# Patient Record
Sex: MALE | Race: BLACK OR AFRICAN AMERICAN
[De-identification: names, ages, dates, MRNs, and addresses within clinical notes are randomized per-mention and may not be internally consistent; named-entity substitution may affect disease eponyms.]

---

## 2019-06-24 ENCOUNTER — HOSPITAL ENCOUNTER (EMERGENCY)
Dept: HOSPITAL 47 - EC | Age: 29
Discharge: HOME | End: 2019-06-24
Payer: COMMERCIAL

## 2019-06-24 VITALS
TEMPERATURE: 98.7 F | HEART RATE: 81 BPM | DIASTOLIC BLOOD PRESSURE: 87 MMHG | RESPIRATION RATE: 18 BRPM | SYSTOLIC BLOOD PRESSURE: 134 MMHG

## 2019-06-24 DIAGNOSIS — K52.9: ICD-10-CM

## 2019-06-24 DIAGNOSIS — B36.0: Primary | ICD-10-CM

## 2019-06-24 DIAGNOSIS — F17.200: ICD-10-CM

## 2019-06-24 LAB
ALBUMIN SERPL-MCNC: 4.5 G/DL (ref 3.5–5)
ALP SERPL-CCNC: 76 U/L (ref 38–126)
ALT SERPL-CCNC: 21 U/L (ref 21–72)
ANION GAP SERPL CALC-SCNC: 10 MMOL/L
AST SERPL-CCNC: 36 U/L (ref 17–59)
BUN SERPL-SCNC: 14 MG/DL (ref 9–20)
CALCIUM SPEC-MCNC: 9.5 MG/DL (ref 8.4–10.2)
CELLS COUNTED: 100
CHLORIDE SERPL-SCNC: 99 MMOL/L (ref 98–107)
CO2 SERPL-SCNC: 32 MMOL/L (ref 22–30)
ERYTHROCYTE [DISTWIDTH] IN BLOOD BY AUTOMATED COUNT: 5.01 M/UL (ref 4.3–5.9)
ERYTHROCYTE [DISTWIDTH] IN BLOOD: 13.5 % (ref 11.5–15.5)
GLUCOSE SERPL-MCNC: 83 MG/DL (ref 74–99)
HCT VFR BLD AUTO: 41.6 % (ref 39–53)
HGB BLD-MCNC: 13.8 GM/DL (ref 13–17.5)
LIPASE SERPL-CCNC: 39 U/L (ref 23–300)
LYMPHOCYTES # BLD MANUAL: 1.64 K/UL (ref 1–4.8)
MCH RBC QN AUTO: 27.5 PG (ref 25–35)
MCHC RBC AUTO-ENTMCNC: 33.1 G/DL (ref 31–37)
MCV RBC AUTO: 83 FL (ref 80–100)
MONOCYTES # BLD MANUAL: 0.24 K/UL (ref 0–1)
NEUTROPHILS NFR BLD MANUAL: 53 %
NEUTS SEG # BLD MANUAL: 2.12 K/UL (ref 1.3–7.7)
PH UR: 6.5 [PH] (ref 5–8)
PLATELET # BLD AUTO: 215 K/UL (ref 150–450)
POTASSIUM SERPL-SCNC: 3.8 MMOL/L (ref 3.5–5.1)
PROT SERPL-MCNC: 7.8 G/DL (ref 6.3–8.2)
SODIUM SERPL-SCNC: 141 MMOL/L (ref 137–145)
SP GR UR: 1.02 (ref 1–1.03)
UROBILINOGEN UR QL STRIP: 6 MG/DL (ref ?–2)
WBC # BLD AUTO: 4 K/UL (ref 3.8–10.6)

## 2019-06-24 PROCEDURE — 85025 COMPLETE CBC W/AUTO DIFF WBC: CPT

## 2019-06-24 PROCEDURE — 36415 COLL VENOUS BLD VENIPUNCTURE: CPT

## 2019-06-24 PROCEDURE — 96360 HYDRATION IV INFUSION INIT: CPT

## 2019-06-24 PROCEDURE — 83690 ASSAY OF LIPASE: CPT

## 2019-06-24 PROCEDURE — 81003 URINALYSIS AUTO W/O SCOPE: CPT

## 2019-06-24 PROCEDURE — 80053 COMPREHEN METABOLIC PANEL: CPT

## 2019-06-24 PROCEDURE — 99284 EMERGENCY DEPT VISIT MOD MDM: CPT

## 2019-06-24 NOTE — ED
Nausea/Vomiting/Diarrhea HPI





- General


Chief complaint: Nausea/Vomiting/Diarrhea


Stated complaint: NEEDS CHECKUP


Time Seen by Provider: 06/24/19 18:13


Source: patient, RN notes reviewed


Mode of arrival: ambulatory


Limitations: no limitations





- History of Present Illness


Initial comments: 





28-year-old male presents emergency Department chief complaint of diarrhea and 

rash.  Patient states that he had nausea vomiting yesterday continuation of 

diarrhea today.  Patient has mild abdominal discomfort.  No fevers chills.  

Denies any sick contacts.  Patient states he is able tolerate oral intake at 

this time.  No dysuria no hematuria.  Patient states she's noticed that diffuse 

generalized rash primarily his torso region which seems to get worse when he is 

exposed to sun.  Patient states is not itchy or painful.  Patient denies any new

medications.  [employer] detergents.





- Related Data


                                  Previous Rx's











 Medication  Instructions  Recorded


 


Ketoconazole 2% Shampoo [Nizoral] 1 applic TOPICAL DAILY #120 ml 06/24/19











                                    Allergies











Allergy/AdvReac Type Severity Reaction Status Date / Time


 


No Known Allergies Allergy   Verified 06/24/19 18:57














Review of Systems


ROS Statement: 


Those systems with pertinent positive or pertinent negative responses have been 

documented in the HPI.





ROS Other: All systems not noted in ROS Statement are negative.





Past Medical History


Past Medical History: No Reported History


History of Any Multi-Drug Resistant Organisms: None Reported


Additional Past Surgical History / Comment(s): SKIN GRAFT


Past Psychological History: No Psychological Hx Reported


Smoking Status: Current every day smoker


Past Alcohol Use History: Occasional


Past Drug Use History: Marijuana





General Exam


Limitations: no limitations


General appearance: alert, in no apparent distress


Head exam: Present: atraumatic, normocephalic, normal inspection


Eye exam: Present: normal appearance, PERRL, EOMI.  Absent: scleral icterus, 

conjunctival injection, periorbital swelling


ENT exam: Present: normal oropharynx, mucous membranes moist


Neck exam: Present: normal inspection, full ROM.  Absent: tenderness, 

meningismus, lymphadenopathy


Respiratory exam: Present: normal lung sounds bilaterally.  Absent: respiratory 

distress, wheezes, rales, rhonchi, stridor


Cardiovascular Exam: Present: regular rate, normal rhythm, normal heart sounds. 

 Absent: systolic murmur, diastolic murmur, rubs, gallop, clicks


GI/Abdominal exam: Present: soft, normal bowel sounds.  Absent: distended, 

tenderness, guarding, rebound, rigid


Neurological exam: Present: alert, oriented X3, CN II-XII intact


Skin exam: Present: warm, dry, intact, normal color, rash (pigmented areas noted

 on the torso region)





Course


                                   Vital Signs











  06/24/19





  18:09


 


Temperature 98.6 F


 


Pulse Rate 60


 


Respiratory 16





Rate 


 


Blood Pressure 137/75


 


O2 Sat by Pulse 99





Oximetry 














Medical Decision Making





- Medical Decision Making





28-year-old male presents emergency from for rash and diarrhea.  Patient has a 

viral gastroenteritis.  Patient is but is stable, labs unremarkable.  Patient 

also has a rash which is tinea versicolor.  Patient will be given every 12 hours

 all cream.  Patient will be discharged return parameters were discussed.





- Lab Data


Result diagrams: 


                                 06/24/19 18:26





                                 06/24/19 18:26


                                   Lab Results











  06/24/19 06/24/19 06/24/19 Range/Units





  18:26 18:26 18:26 


 


WBC  4.0    (3.8-10.6)  k/uL


 


RBC  5.01    (4.30-5.90)  m/uL


 


Hgb  13.8    (13.0-17.5)  gm/dL


 


Hct  41.6    (39.0-53.0)  %


 


MCV  83.0    (80.0-100.0)  fL


 


MCH  27.5    (25.0-35.0)  pg


 


MCHC  33.1    (31.0-37.0)  g/dL


 


RDW  13.5    (11.5-15.5)  %


 


Plt Count  215    (150-450)  k/uL


 


Sodium   141   (137-145)  mmol/L


 


Potassium   3.8   (3.5-5.1)  mmol/L


 


Chloride   99   ()  mmol/L


 


Carbon Dioxide   32 H   (22-30)  mmol/L


 


Anion Gap   10   mmol/L


 


BUN   14   (9-20)  mg/dL


 


Creatinine   1.12   (0.66-1.25)  mg/dL


 


Est GFR (CKD-EPI)AfAm   >90   (>60 ml/min/1.73 sqM)  


 


Est GFR (CKD-EPI)NonAf   >90   (>60 ml/min/1.73 sqM)  


 


Glucose   83   (74-99)  mg/dL


 


Calcium   9.5   (8.4-10.2)  mg/dL


 


Total Bilirubin   0.4   (0.2-1.3)  mg/dL


 


AST   36   (17-59)  U/L


 


ALT   21   (21-72)  U/L


 


Alkaline Phosphatase   76   ()  U/L


 


Total Protein   7.8   (6.3-8.2)  g/dL


 


Albumin   4.5   (3.5-5.0)  g/dL


 


Lipase   39   ()  U/L


 


Urine Color    Yellow  


 


Urine Appearance    Clear  (Clear)  


 


Urine pH    6.5  (5.0-8.0)  


 


Ur Specific Gravity    1.018  (1.001-1.035)  


 


Urine Protein    Negative  (Negative)  


 


Urine Glucose (UA)    Negative  (Negative)  


 


Urine Ketones    Trace H  (Negative)  


 


Urine Blood    Negative  (Negative)  


 


Urine Nitrite    Negative  (Negative)  


 


Urine Bilirubin    Negative  (Negative)  


 


Urine Urobilinogen    6.0  (<2.0)  mg/dL


 


Ur Leukocyte Esterase    Negative  (Negative)  














Disposition


Clinical Impression: 


 Tinea versicolor, Gastroenteritis





Disposition: HOME SELF-CARE


Condition: Stable


Instructions (If sedation given, give patient instructions):  Tinea Versicolor 

(ED)


Additional Instructions: 


Please return to the Emergency Department if symptoms worsen or any other 

concerns.


Prescriptions: 


Ketoconazole 2% Shampoo [Nizoral] 1 applic TOPICAL DAILY #120 ml


Is patient prescribed a controlled substance at d/c from ED?: No


Referrals: 


None,Stated [Primary Care Provider] - 1-2 days


Time of Disposition: 19:32

## 2020-11-29 ENCOUNTER — HOSPITAL ENCOUNTER (EMERGENCY)
Dept: HOSPITAL 47 - EC | Age: 30
Discharge: HOME | End: 2020-11-29
Payer: COMMERCIAL

## 2020-11-29 VITALS
TEMPERATURE: 97 F | DIASTOLIC BLOOD PRESSURE: 69 MMHG | SYSTOLIC BLOOD PRESSURE: 122 MMHG | HEART RATE: 72 BPM | RESPIRATION RATE: 18 BRPM

## 2020-11-29 DIAGNOSIS — Z71.1: ICD-10-CM

## 2020-11-29 DIAGNOSIS — Z20.828: Primary | ICD-10-CM

## 2020-11-29 DIAGNOSIS — F17.200: ICD-10-CM

## 2020-11-29 PROCEDURE — 87491 CHLMYD TRACH DNA AMP PROBE: CPT

## 2020-11-29 PROCEDURE — 99283 EMERGENCY DEPT VISIT LOW MDM: CPT

## 2020-11-29 PROCEDURE — 87591 N.GONORRHOEAE DNA AMP PROB: CPT

## 2020-11-29 NOTE — ED
Male Urogenital HPI





- General


Chief complaint: Urogenital


Stated complaint: Poss herpes, wants testing


Time Seen by Provider: 11/29/20 00:14


Source: patient


Mode of arrival: ambulatory


Limitations: no limitations





- History of Present Illness


Initial comments: 


30-year-old male patient presents to the emergency department today requesting 

testing for herpes.  Patient states that he was with a partner who has known 

herpes infection.  States the partner did not have an outbreak during their 

interaction.  He denies any current sores or lesions. Denies any hematuria, 

dysuria, or drainage from the penis. He states he does want to be tested for 

other STIs as well.  Patient denies any recent rash, fever, chills, cough, 

shortness of breath, chest pain, abdominal pain, nausea, vomiting, diarrhea, 

constipation, back pain, numbness, tingling, dizziness, weakness, headache, 

visual changes, or any other complaints.





- Related Data


                                  Previous Rx's











 Medication  Instructions  Recorded


 


Ketoconazole 2% Shampoo [Nizoral] 1 applic TOPICAL DAILY #120 ml 06/24/19











                                    Allergies











Allergy/AdvReac Type Severity Reaction Status Date / Time


 


No Known Allergies Allergy   Verified 11/29/20 00:09














Review of Systems


ROS Statement: 


Those systems with pertinent positive or pertinent negative responses have been 

documented in the HPI.





ROS Other: All systems not noted in ROS Statement are negative.





Past Medical History


Past Medical History: No Reported History


History of Any Multi-Drug Resistant Organisms: None Reported


Past Surgical History: No Surgical Hx Reported


Additional Past Surgical History / Comment(s): SKIN GRAFT


Past Psychological History: No Psychological Hx Reported


Smoking Status: Current every day smoker


Past Alcohol Use History: Occasional


Past Drug Use History: Marijuana





General Exam


Limitations: no limitations


General appearance: alert, in no apparent distress, other (This is a well-

developed, well-nourished male patient in no acute distress.  Vital signs upon 

presentation are temperature 97.0F, pulse 72, respirations 18, blood pressure 

122/69, pulse ox 100% on room air.)


ENT exam: Present: normal exam, normal oropharynx, mucous membranes moist


Respiratory exam: Present: normal lung sounds bilaterally.  Absent: respiratory 

distress, wheezes, rales, rhonchi, stridor


Cardiovascular Exam: Present: regular rate, normal rhythm, normal heart sounds. 

 Absent: systolic murmur, diastolic murmur, rubs, gallop, clicks


Neurological exam: Present: alert, oriented X3, CN II-XII intact


Psychiatric exam: Present: normal affect, normal mood


Skin exam: Present: warm, dry, intact, normal color.  Absent: rash





Course


                                   Vital Signs











  11/29/20





  00:05


 


Temperature 97.0 F L


 


Pulse Rate 72


 


Respiratory 18





Rate 


 


Blood Pressure 122/69


 


O2 Sat by Pulse 100





Oximetry 














Medical Decision Making





- Medical Decision Making


30-year-old male patient presents to the emergency department today requesting 

testing for herpes and sexually transmitted infections.  Patient denies any 

current symptoms.  Has no lesions or sores.  We did discuss that if he should 

have an outbreak he can then be tested for herpes.  We did take a urine sample 

to test for gonorrhea and chlamydia.  He'll be informed of the results in 3 days

 if he is positive.  He'll be discharged to follow up with his primary care 

physician for recheck in 1-2 days.  Return parameters discussed in detail.  He 

verbalizes understanding and agrees with this plan.








Disposition


Clinical Impression: 


 Exposure to herpes, Concern about STD in male without diagnosis





Disposition: HOME SELF-CARE


Condition: Good


Additional Instructions: 


Await culture results.  Follow-up through primary care physician for recheck in 

1-2 days.  Return to the emergency department immediately for any new, 

worsening, or concerning symptoms


Is patient prescribed a controlled substance at d/c from ED?: No


Referrals: 


None,Stated [Primary Care Provider] - 1-2 days


Time of Disposition: 00:26

## 2021-04-07 ENCOUNTER — HOSPITAL ENCOUNTER (EMERGENCY)
Dept: HOSPITAL 47 - EC | Age: 31
Discharge: HOME | End: 2021-04-07
Payer: COMMERCIAL

## 2021-04-07 VITALS
TEMPERATURE: 98.2 F | SYSTOLIC BLOOD PRESSURE: 115 MMHG | RESPIRATION RATE: 20 BRPM | HEART RATE: 56 BPM | DIASTOLIC BLOOD PRESSURE: 71 MMHG

## 2021-04-07 DIAGNOSIS — F12.90: ICD-10-CM

## 2021-04-07 DIAGNOSIS — F17.200: ICD-10-CM

## 2021-04-07 DIAGNOSIS — L30.9: ICD-10-CM

## 2021-04-07 DIAGNOSIS — N34.2: Primary | ICD-10-CM

## 2021-04-07 LAB
PH UR: 6.5 [PH] (ref 5–8)
SP GR UR: 1.02 (ref 1–1.03)
UROBILINOGEN UR QL STRIP: 2 MG/DL (ref ?–2)
WBC # UR AUTO: 6 /HPF (ref 0–5)

## 2021-04-07 PROCEDURE — 87491 CHLMYD TRACH DNA AMP PROBE: CPT

## 2021-04-07 PROCEDURE — 96372 THER/PROPH/DIAG INJ SC/IM: CPT

## 2021-04-07 PROCEDURE — 99283 EMERGENCY DEPT VISIT LOW MDM: CPT

## 2021-04-07 PROCEDURE — 81001 URINALYSIS AUTO W/SCOPE: CPT

## 2021-04-07 PROCEDURE — 87591 N.GONORRHOEAE DNA AMP PROB: CPT

## 2021-04-07 NOTE — ED
Male Urogenital HPI





- General


Chief complaint: Skin/Abscess/Foreign Body


Stated complaint: Rash


Time Seen by Provider: 04/07/21 02:49


Source: patient, RN notes reviewed, old records reviewed


Mode of arrival: ambulatory


Limitations: no limitations





- History of Present Illness


Initial comments: 





This is a 22-year-old male with testicular rash.  Patient has rash maybe some 

ingrown hairs in his scrotum shape.  Patient doesn't discoloration of both 

areas.  No drainage no difficulty when urinating.  Patient has no new sexual 

contacts.  No other significant complaints


MD Complaint: testicle pain, testicle swelling, other (rash)


-: unknown


Location: penis (scrotum)


Radiation: none


Severity: mild


Consistency: constant


Improves with: none


Worsens with: none


Reports: rash





- Related Data


                                Home Medications











 Medication  Instructions  Recorded  Confirmed


 


No Known Home Medications  04/07/21 04/07/21











                                    Allergies











Allergy/AdvReac Type Severity Reaction Status Date / Time


 


No Known Allergies Allergy   Verified 04/07/21 02:47














Review of Systems


ROS Statement: 


Those systems with pertinent positive or pertinent negative responses have been 

documented in the HPI.





ROS Other: All systems not noted in ROS Statement are negative.





Past Medical History


Past Medical History: No Reported History


History of Any Multi-Drug Resistant Organisms: None Reported


Past Surgical History: No Surgical Hx Reported


Additional Past Surgical History / Comment(s): SKIN GRAFT


Past Psychological History: No Psychological Hx Reported


Smoking Status: Current every day smoker


Past Alcohol Use History: Occasional


Past Drug Use History: Marijuana





General Exam


Limitations: no limitations


General appearance: alert, in no apparent distress


Head exam: Present: atraumatic, normocephalic, normal inspection


Eye exam: Present: normal appearance, PERRL, EOMI.  Absent: scleral icterus, 

conjunctival injection, periorbital swelling


ENT exam: Present: normal exam, mucous membranes moist


Neck exam: Present: normal inspection.  Absent: tenderness, meningismus, 

lymphadenopathy


Respiratory exam: Present: normal lung sounds bilaterally.  Absent: respiratory 

distress, wheezes, rales, rhonchi, stridor


Cardiovascular Exam: Present: regular rate, normal rhythm, normal heart sounds. 

Absent: systolic murmur, diastolic murmur, rubs, gallop, clicks


GI/Abdominal exam: Present: soft, normal bowel sounds.  Absent: distended, 

tenderness, guarding, rebound, rigid


Extremities exam: Present: normal inspection, full ROM, normal capillary refill.

 Absent: tenderness, pedal edema, joint swelling, calf tenderness


Back exam: Present: normal inspection


Neurological exam: Present: alert, oriented X3, CN II-XII intact


Psychiatric exam: Present: normal affect, normal mood


Skin exam: Present: warm, dry, intact, normal color.  Absent: rash





Course


                                   Vital Signs











  04/07/21





  02:44


 


Temperature 98.2 F


 


Pulse Rate 56 L


 


Respiratory 20





Rate 


 


Blood Pressure 115/71


 


O2 Sat by Pulse 99





Oximetry 














- Reevaluation(s)


Reevaluation #1: 





04/07/21 03:16


Medical records reviewed


Reevaluation #2: 





04/07/21 03:16


Spoke with patient regarding findings, questions answered





Medical Decision Making





- Medical Decision Making





30 male can be discharged home with this point.  Patient given antibiotics and 

follow-up with primary care awaiting cultures





Disposition


Clinical Impression: 


 Dermatitis, Urethritis





Disposition: HOME SELF-CARE


Condition: Good


Instructions (If sedation given, give patient instructions):  Nonspecific 

Urethritis in Men (ED)


Is patient prescribed a controlled substance at d/c from ED?: No


Referrals: 


None,Stated [Primary Care Provider] - 1-2 days

## 2022-05-13 ENCOUNTER — HOSPITAL ENCOUNTER (EMERGENCY)
Dept: HOSPITAL 47 - EC | Age: 32
End: 2022-05-13
Payer: COMMERCIAL

## 2022-05-13 DIAGNOSIS — L02.211: Primary | ICD-10-CM

## 2022-05-13 DIAGNOSIS — F17.200: ICD-10-CM

## 2022-05-13 PROCEDURE — 10060 I&D ABSCESS SIMPLE/SINGLE: CPT

## 2022-05-13 PROCEDURE — 99283 EMERGENCY DEPT VISIT LOW MDM: CPT

## 2022-06-11 ENCOUNTER — HOSPITAL ENCOUNTER (EMERGENCY)
Dept: HOSPITAL 47 - EC | Age: 32
LOS: 1 days | Discharge: HOME | End: 2022-06-12
Payer: COMMERCIAL

## 2022-06-11 VITALS — HEART RATE: 67 BPM | RESPIRATION RATE: 16 BRPM | DIASTOLIC BLOOD PRESSURE: 80 MMHG | SYSTOLIC BLOOD PRESSURE: 117 MMHG

## 2022-06-11 VITALS — TEMPERATURE: 98.5 F

## 2022-06-11 DIAGNOSIS — Z20.822: ICD-10-CM

## 2022-06-11 DIAGNOSIS — A08.4: Primary | ICD-10-CM

## 2022-06-11 DIAGNOSIS — F17.200: ICD-10-CM

## 2022-06-11 PROCEDURE — 87635 SARS-COV-2 COVID-19 AMP PRB: CPT

## 2022-06-11 PROCEDURE — 99284 EMERGENCY DEPT VISIT MOD MDM: CPT

## 2022-06-11 NOTE — ED
Nausea/Vomiting/Diarrhea HPI





- General


Chief complaint: Nausea/Vomiting/Diarrhea


Stated complaint: Abd pain,diarrhea


Time Seen by Provider: 06/11/22 23:15


Source: patient, RN notes reviewed


Mode of arrival: ambulatory





- History of Present Illness


Initial comments: 





Patient presents to the right back complaining of nausea, vomiting, and diarrhea

since going on for about 3 days.  Patient states possible mild nasal congestion.

 No cough.  No shortness of breath.  No fever.  No chest pain.  Patient denies 

any hematochezia or melena.  No hematemesis or coffee-ground emesis.  No e

xposures that the patient knows of.  denying any pain.  No skin rashes or 

lesions.  No problems with urination.  No headache.  No vision or hearing 

changes.  No sore throat.  No neck pain.  No back pain.





Patient has no significant past medical history.


MD complaint: nausea, vomiting, diarrhea





- Related Data


                                  Previous Rx's











 Medication  Instructions  Recorded


 


Ondansetron Odt [Zofran Odt] 4 mg PO Q6HR PRN #10 tab 06/11/22











                                    Allergies











Allergy/AdvReac Type Severity Reaction Status Date / Time


 


No Known Allergies Allergy   Verified 06/11/22 22:14














Review of Systems


ROS Statement: 


Those systems with pertinent positive or pertinent negative responses have been 

documented in the HPI.





ROS Other: All systems not noted in ROS Statement are negative.





Past Medical History


Past Medical History: No Reported History


History of Any Multi-Drug Resistant Organisms: None Reported


Past Surgical History: No Surgical Hx Reported


Additional Past Surgical History / Comment(s): SKIN GRAFT


Past Psychological History: No Psychological Hx Reported


Smoking Status: Current every day smoker


Past Alcohol Use History: Occasional


Past Drug Use History: Marijuana





General Exam





- General Exam Comments


Initial Comments: 





Patient does not appear to be ill or toxic.  Vital signs stable, patient 

afebrile


General appearance: alert, in no apparent distress


Head exam: Present: atraumatic, normocephalic, normal inspection


Eye exam: Present: normal appearance, PERRL, EOMI.  Absent: scleral icterus, 

conjunctival injection, periorbital swelling


ENT exam: Present: normal exam, normal oropharynx, mucous membranes moist, 

normal external ear exam.  Absent: mucous membranes dry


Neck exam: Present: normal inspection, full ROM.  Absent: tenderness, 

meningismus, lymphadenopathy


Respiratory exam: Present: normal lung sounds bilaterally.  Absent: respiratory 

distress, wheezes, rales, rhonchi, stridor


Cardiovascular Exam: Present: regular rate, normal rhythm, normal heart sounds. 

Absent: systolic murmur, diastolic murmur, rubs, gallop, clicks


GI/Abdominal exam: Present: soft, hyperactive bowel sounds.  Absent: distended, 

tenderness, guarding, rebound, rigid, diminished bowel sounds, hypoactive bowel 

sounds, organomegaly, mass, bruit, pulsatile mass, hernia, other


 exam: Present: normal inspection.  Absent: testicular tenderness, urethral d

ischarge


Extremities exam: Present: normal inspection, full ROM, normal capillary refill.

 Absent: tenderness, pedal edema, joint swelling, calf tenderness


Back exam: Present: normal inspection


Neurological exam: Present: alert, oriented X3, CN II-XII intact


Psychiatric exam: Present: normal affect, normal mood


Skin exam: Present: warm, dry, intact, normal color.  Absent: rash





Course


                                   Vital Signs











  06/11/22 06/11/22 06/11/22





  22:12 23:56 23:59


 


Temperature 98.5 F  


 


Pulse Rate 61 67 67


 


Respiratory 19 16 16





Rate   


 


Blood Pressure  117/80 117/80


 


O2 Sat by Pulse 100 100 100





Oximetry   














Medical Decision Making





- Medical Decision Making





Patient septostomy most consistent with viral gastroenteritis.  I did suggest 

that we do a COVID-19 test as some people get mostly GI symptoms with this.  

Patient abdominal exam was benign.  Patient concurs with this testing.  We'll 

treat conservatively with Zofran and hydration.  Discussed the possibility of ot

her etiologies with the patient.





Patient was told to return to the ER for any signs or symptoms worsen.  Told to 

return immediately if any other problems arise.  All questions answered.  

Treatment plan discussed.  Patient in agreement


Every effort has been made to ensure accuracy of this dictation.  However, due 

to the limitations of electronic medical records and dictation devices, errors 

in charting still occur.





Supervisor, Dr. Lovell





- Lab Data


                                   Lab Results











  06/11/22 Range/Units





  23:56 


 


Coronavirus (PCR)  Not Detected  (Not Detectd)  














Disposition


Clinical Impression: 


 Viral gastroenteritis





Disposition: HOME SELF-CARE


Condition: Good


Instructions (If sedation given, give patient instructions):  Gastroenteritis 

(ED)


Additional Instructions: 


Follow-up with your regular physician as directed.  Return to the ER immediately

if any symptoms worsen, new symptoms arise, or any other problems develop.


Prescriptions: 


Ondansetron Odt [Zofran Odt] 4 mg PO Q6HR PRN #10 tab


 PRN Reason: Nausea


Is patient prescribed a controlled substance at d/c from ED?: No


Referrals: 


Sukhdev Card [STAFF PHYSICIAN] - 06/15/22 (As needed)


Time of Disposition: 23:31

## 2024-11-07 ENCOUNTER — HOSPITAL ENCOUNTER (EMERGENCY)
Dept: HOSPITAL 47 - EC | Age: 34
LOS: 1 days | Discharge: HOME | End: 2024-11-08
Payer: COMMERCIAL

## 2024-11-07 VITALS — HEART RATE: 87 BPM | TEMPERATURE: 98.1 F | RESPIRATION RATE: 18 BRPM

## 2024-11-07 DIAGNOSIS — Z20.2: Primary | ICD-10-CM

## 2024-11-07 DIAGNOSIS — F17.200: ICD-10-CM

## 2024-11-07 PROCEDURE — 96372 THER/PROPH/DIAG INJ SC/IM: CPT

## 2024-11-07 PROCEDURE — 99283 EMERGENCY DEPT VISIT LOW MDM: CPT

## 2024-11-07 PROCEDURE — 87491 CHLMYD TRACH DNA AMP PROBE: CPT

## 2024-11-07 PROCEDURE — 87591 N.GONORRHOEAE DNA AMP PROB: CPT

## 2024-11-07 RX ADMIN — DOXYCYCLINE STA MG: 100 CAPSULE ORAL at 23:52

## 2024-11-07 RX ADMIN — CEFTRIAXONE SODIUM STA MG: 1 INJECTION, POWDER, FOR SOLUTION INTRAMUSCULAR; INTRAVENOUS at 23:52
